# Patient Record
Sex: FEMALE | Race: WHITE | NOT HISPANIC OR LATINO | Employment: FULL TIME | URBAN - METROPOLITAN AREA
[De-identification: names, ages, dates, MRNs, and addresses within clinical notes are randomized per-mention and may not be internally consistent; named-entity substitution may affect disease eponyms.]

---

## 2023-03-09 ENCOUNTER — TELEPHONE (OUTPATIENT)
Dept: FAMILY MEDICINE CLINIC | Facility: CLINIC | Age: 28
End: 2023-03-09
Payer: COMMERCIAL

## 2023-03-09 NOTE — TELEPHONE ENCOUNTER
Caller: Fernanda Padilla    Relationship: Self    Best call back number: [phone]    What is the best time to reach you: ANYTIME    Who are you requesting to speak with (clinical staff, provider,  specific staff member): MA        What was the call regarding: PATIENT HAS A WOUND ON HER LEFT INDEX FINGER AND IT DOES NOT LOOK GOOD AND SHE WAS TRYING TO SEND PIC OF IT BUT HER MYCHART IS SAYING SHE DOES NOT HAVE A PCP LOADED SO SHE IS WANTING TO GET THAT UPDATED AND SPEAK TO SOMEONE ABOUT THE WOUND.    SHE IS ALSO TAKING AN ANTIBIOTIC THAT WAS PRESCRIBED TO HER BY DERMATOLOGIST FOR SOMETHING ELSE SHE IS JUST NOT SURE WHAT TO DO.      Do you require a callback: YES

## 2023-03-10 NOTE — TELEPHONE ENCOUNTER
I can't address the mychart access but she may need to go to urgent care.  Basic wound care of washing the finger, cleaning the wound and applying an antibiotic ointment is first line.

## 2024-01-31 ENCOUNTER — OFFICE VISIT (OUTPATIENT)
Dept: FAMILY MEDICINE CLINIC | Facility: CLINIC | Age: 29
End: 2024-01-31
Payer: COMMERCIAL

## 2024-01-31 VITALS
DIASTOLIC BLOOD PRESSURE: 73 MMHG | HEART RATE: 72 BPM | BODY MASS INDEX: 26.65 KG/M2 | WEIGHT: 190.4 LBS | RESPIRATION RATE: 16 BRPM | SYSTOLIC BLOOD PRESSURE: 112 MMHG | TEMPERATURE: 99 F | HEIGHT: 71 IN

## 2024-01-31 DIAGNOSIS — R63.5 WEIGHT GAIN: ICD-10-CM

## 2024-01-31 DIAGNOSIS — Z00.00 ANNUAL PHYSICAL EXAM: Primary | ICD-10-CM

## 2024-01-31 DIAGNOSIS — E66.3 OVERWEIGHT (BMI 25.0-29.9): ICD-10-CM

## 2024-01-31 DIAGNOSIS — Z23 ENCOUNTER FOR VACCINATION: ICD-10-CM

## 2024-01-31 DIAGNOSIS — Z11.59 ENCOUNTER FOR HEPATITIS C SCREENING TEST FOR LOW RISK PATIENT: ICD-10-CM

## 2024-01-31 DIAGNOSIS — Z13.220 NEED FOR LIPID SCREENING: ICD-10-CM

## 2024-01-31 RX ORDER — NORGESTIMATE AND ETHINYL ESTRADIOL 0.25-0.035
KIT ORAL
COMMUNITY
Start: 2023-10-18

## 2024-01-31 RX ORDER — SODIUM SULFACETAMIDE 100 MG/ML
LIQUID TOPICAL
COMMUNITY
Start: 2024-01-18

## 2024-01-31 RX ORDER — CLINDAMYCIN PHOSPHATE 10 UG/ML
LOTION TOPICAL
COMMUNITY

## 2024-01-31 RX ORDER — ESCITALOPRAM OXALATE 20 MG/1
TABLET, FILM COATED ORAL
COMMUNITY
Start: 2023-03-26

## 2024-01-31 RX ORDER — SPIRONOLACTONE 50 MG/1
TABLET, FILM COATED ORAL
COMMUNITY
Start: 2024-01-17

## 2024-01-31 NOTE — PATIENT INSTRUCTIONS
I have ordered lab tests today.  You should receive a phone call or a [employer] message with those results.  If you have not heard from us in 7-10 days, please call the office.      Try the runners stretch and rolling your foot on a frozen water bottle can help your foot.  Also arch supports. Also try 2 Aleve twice a day or Diclofenac gel.     We talked about weight loss medication but you're really not a candidate.  Wellbutrin may be an option to bring up with your mental health professional.  It may help with smoking.

## 2024-01-31 NOTE — PROGRESS NOTES
"Chief Complaint  Annual Exam    Subjective    {CC  Problem List  Visit  Diagnosis   Encounters  Notes  Medications  Labs  Result Review Imaging  Media :23}     Fernanda Padilla presents to [facility] for [facility] Exam.    History of Present Illness     Scalp bump.    Left foot pain on the bottom of her foot.  Worse after standing on her feet all day and first thing in the morning.      She's struggling with her weight.  It's been worse since a medication change.  She used Weight [employer] to lose 40pounds in the past and kept it off.  She's been trying for 5 months with WW and has actually gained.  Her med change were IUD to OCP, stopping Buspar and doubling spironolactone (just a couple weeks ago).  Her anxiety is not under control so she stopped Buspirone because she felt like it was making it worse.  She's still on Lexapro and working with a mental health professional.     Annual Exam    Last pap smear: today with Dr. Diaz  Last mammogram: too young  Contraception: OCP's and in a five year relationship  Regular menstrual cycles: yes  Last colonoscopy: too young  Ever screened for Hepatitis C: no  Vaccines: due flu  Exercise: walking to work and playing volleyball   Smoking status: former and some vaping  Alcohol use: socially and not even weekly  Sunscreen use: yes    Review of Systems     Objective       Vital Signs:   /73 (BP Location: Left arm, Patient Position: Sitting, Cuff Size: Adult)   Pulse 72   Temp 99 °F (37.2 °C) (Oral)   Resp 16   Ht 180.3 cm (71\")   Wt 86.4 kg (190 lb 6.4 oz)   BMI 26.56 kg/m²     Body mass index is 26.56 kg/m².       PHQ-9 Total Score: 0     Physical Exam     Result Review  Data Reviewed:{ Labs  Result Review  Imaging  Med Tab  Media :23}               Discussed healthy diet, exercise, adequate sleep, cancer screening, immunizations and preventative care. Annual eye exam and routine dental cleaning encouraged.        Assessment and Plan {CC " Problem List  Visit Diagnosis  ROS  Review (Popup)  [facility] Maintenance  Quality  BestPractice  Medications  SmartSets  [employer] Encounters  Media :23}   Diagnoses and all orders for this visit:    1. Annual physical exam (Primary)    2. Need for lipid screening  -     Comprehensive Metabolic Panel  -     Lipid Panel    3. Encounter for vaccination  -     Fluzone (or Fluarix & Flulaval for VFC) >6mos    4. Encounter for hepatitis C screening test for low risk patient  -     Hepatitis C Antibody    5. Weight gain  -     TSH    6. Overweight (BMI 25.0-29.9)  Assessment & Plan:  Patient's (Body mass index is 26.56 kg/m².) indicates that they are overweight with health conditions that include none . Weight is worsening. BMI is is above average; BMI management plan is completed. We discussed exercise and portion control as well as various medications that she really doesn't qualify for. .           Patient Instructions   I have ordered lab tests today.  You should receive a phone call or a [employer] message with those results.  If you have not heard from us in 7-10 days, please call the office.      Try the runners stretch and rolling your foot on a frozen water bottle can help your foot.  Also arch supports. Also try 2 Aleve twice a day or Diclofenac gel.     We talked about weight loss medication but you're really not a candidate.  Wellbutrin may be an option to bring up with your mental health professional.  It may help with smoking.        No follow-ups on file.    Kimberlee Bermudez MD

## 2024-01-31 NOTE — ASSESSMENT & PLAN NOTE
Patient's (Body mass index is 26.56 kg/m².) indicates that they are overweight with health conditions that include none . Weight is worsening. BMI is is above average; BMI management plan is completed. We discussed exercise and portion control as well as various medications that she really doesn't qualify for. .

## 2024-02-01 LAB
ALBUMIN SERPL-MCNC: 4.5 G/DL (ref 3.5–5.2)
ALBUMIN/GLOB SERPL: 1.6 G/DL
ALP SERPL-CCNC: 47 U/L (ref 39–117)
ALT SERPL-CCNC: 13 U/L (ref 1–33)
AST SERPL-CCNC: 15 U/L (ref 1–32)
BILIRUB SERPL-MCNC: 0.5 MG/DL (ref 0–1.2)
BUN SERPL-MCNC: 13 MG/DL (ref 6–20)
BUN/CREAT SERPL: 18.8 (ref 7–25)
CALCIUM SERPL-MCNC: 9.9 MG/DL (ref 8.6–10.5)
CHLORIDE SERPL-SCNC: 104 MMOL/L (ref 98–107)
CHOLEST SERPL-MCNC: 197 MG/DL (ref 0–200)
CO2 SERPL-SCNC: 22.5 MMOL/L (ref 22–29)
CREAT SERPL-MCNC: 0.69 MG/DL (ref 0.57–1)
EGFRCR SERPLBLD CKD-EPI 2021: 121.4 ML/MIN/1.73
GLOBULIN SER CALC-MCNC: 2.9 GM/DL
GLUCOSE SERPL-MCNC: 88 MG/DL (ref 65–99)
HDLC SERPL-MCNC: 76 MG/DL (ref 40–60)
LDLC SERPL CALC-MCNC: 104 MG/DL (ref 0–100)
POTASSIUM SERPL-SCNC: 4.3 MMOL/L (ref 3.5–5.2)
PROT SERPL-MCNC: 7.4 G/DL (ref 6–8.5)
SODIUM SERPL-SCNC: 138 MMOL/L (ref 136–145)
TRIGL SERPL-MCNC: 94 MG/DL (ref 0–150)
TSH SERPL DL<=0.005 MIU/L-ACNC: 1.58 UIU/ML (ref 0.27–4.2)
VLDLC SERPL CALC-MCNC: 17 MG/DL (ref 5–40)

## 2025-02-05 ENCOUNTER — OFFICE VISIT (OUTPATIENT)
Dept: FAMILY MEDICINE CLINIC | Facility: CLINIC | Age: 30
End: 2025-02-05
Payer: COMMERCIAL

## 2025-02-05 VITALS
WEIGHT: 189.9 LBS | DIASTOLIC BLOOD PRESSURE: 70 MMHG | SYSTOLIC BLOOD PRESSURE: 108 MMHG | TEMPERATURE: 98.5 F | BODY MASS INDEX: 26.59 KG/M2 | OXYGEN SATURATION: 97 % | HEIGHT: 71 IN | HEART RATE: 79 BPM

## 2025-02-05 DIAGNOSIS — Z13.220 NEED FOR LIPID SCREENING: ICD-10-CM

## 2025-02-05 DIAGNOSIS — L70.0 ACNE VULGARIS: ICD-10-CM

## 2025-02-05 DIAGNOSIS — Z00.00 ANNUAL PHYSICAL EXAM: Primary | ICD-10-CM

## 2025-02-05 DIAGNOSIS — F41.1 GENERALIZED ANXIETY DISORDER: ICD-10-CM

## 2025-02-05 DIAGNOSIS — R19.5 LOOSE STOOLS: ICD-10-CM

## 2025-02-05 DIAGNOSIS — J34.89 NASAL CRUSTING: ICD-10-CM

## 2025-02-05 DIAGNOSIS — L50.9 HIVES: ICD-10-CM

## 2025-02-05 PROCEDURE — 99213 OFFICE O/P EST LOW 20 MIN: CPT | Performed by: FAMILY MEDICINE

## 2025-02-05 PROCEDURE — 99395 PREV VISIT EST AGE 18-39: CPT | Performed by: FAMILY MEDICINE

## 2025-02-05 RX ORDER — CETIRIZINE HYDROCHLORIDE 10 MG/1
10 TABLET ORAL DAILY
COMMUNITY

## 2025-02-05 RX ORDER — DICYCLOMINE HYDROCHLORIDE 10 MG/1
10 CAPSULE ORAL
Qty: 60 CAPSULE | Refills: 2 | Status: SHIPPED | OUTPATIENT
Start: 2025-02-05

## 2025-02-05 RX ORDER — BUPROPION HYDROCHLORIDE 150 MG/1
1 TABLET ORAL DAILY
COMMUNITY
Start: 2024-11-12

## 2025-02-05 NOTE — PROGRESS NOTES
"Chief Complaint  Annual Exam    Subjective    {CC  Problem List  Visit  Diagnosis   Encounters  Notes  Medications  Labs  Result Review Imaging  Media :23}     Fernanda Padilla presents to [facility] [place] for Annual Exam.    History of Present Illness     Annual Exam    Last pap smear: 2/5/2025 at [facility] and goes today.   Last mammogram: too young  Contraception: OCP  Regular menstrual cycles: yes  Last colonoscopy: none  Ever screened for Hepatitis C: yes  Vaccines: due flu and covid  Exercise: volleyball and walking in nice weather.   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  She may be changing jobs/industry.     Chronic issues with bloody nose on the left.  It scabs and then she picks the scab off and it the scab comes back     She's upset with her weight and hasn't done it in a healthy way in the past. She gets obsessed with the numbers and wants to lose.    She's having loose stools that contain undigested foods.  They are not solid.    Sees Derm for acne and is on spironolactone.     She sees Ms Brock for mental health.  She's on Lexapro 20mg and Wellbutrin XL 150mg.  Not in counseling and feels okay without it right now.    She had an episode of hives this summer and she's taking Zyrtec routinely so it won't happen again.      Review of Systems     Objective       Vital Signs:   /70 (BP Location: Left arm, Patient Position: Sitting, Cuff Size: Adult)   Pulse 79   Temp 98.5 °F (36.9 °C) (Oral)   Ht 180.3 cm (70.98\")   Wt 86.1 kg (189 lb 14.4 oz)   SpO2 97%   BMI 26.50 kg/m²     Body mass index is 26.5 kg/m².      PHQ-9 Total Score:      Physical Exam  Constitutional:       General: She is not in acute distress.     Appearance: Normal appearance.   HENT:      Head: Normocephalic and atraumatic.      Nose: Nose normal.      Comments: Crusting left " nostril  Eyes:      Conjunctiva/sclera: Conjunctivae normal.      Pupils: Pupils are equal, round, and reactive to light.   Cardiovascular:      Rate and Rhythm: Normal rate and regular rhythm.      Heart sounds: Normal heart sounds.   Pulmonary:      Effort: Pulmonary effort is normal.      Breath sounds: Normal breath sounds.   Abdominal:      General: Bowel sounds are normal.      Palpations: Abdomen is soft.   Musculoskeletal:      Cervical back: Neck supple.   Skin:     General: Skin is warm.   Neurological:      General: No focal deficit present.      Mental Status: She is alert.      Gait: Gait normal.   Psychiatric:         Behavior: Behavior normal.          Result Review  Data Reviewed:{ Labs  Result Review  Imaging  Med Tab  Media :23}               Discussed healthy diet, exercise, adequate sleep, cancer screening, immunizations and preventative care. Annual eye exam and routine dental cleaning encouraged.        Assessment and Plan {CC Problem List  Visit Diagnosis  ROS  Review (Popup)  Health Maintenance  Quality  BestPractice  Medications  SmartSets  Snap[employer]ot Encounters  Media :23}   Diagnoses and all orders for this visit:    1. Annual physical exam (Primary)  -     ABO/Rh    2. Need for lipid screening  -     Comprehensive Metabolic Panel  -     Lipid Panel    3. Loose stools  -     CBC & Differential  -     TSH  -     dicyclomine (BENTYL) 10 MG capsule; Take 1 capsule by mouth 4 (Four) Times a Day Before Meals & at Bedtime. As needed for cramping and loose stools.  Dispense: 60 capsule; Refill: 2    4. Acne vulgaris    5. Generalized anxiety disorder  -     Vitamin B12    6. Nasal crusting    7. Hives      BMI is >= 25 and <30. (Overweight) The following options were offered after discussion;: weight loss educational material (shared in after visit summary)       Patient Instructions   Get your flu and Moderna vaccine at the pharmacy since we don't have either.    I have ordered lab  tests today.  You should receive a phone call or a [employer] message with those results.  If you have not heard from us in 7-10 days, please call the office.      Use Vaseline on a Q - tip multiple times a day to keep that spot soft and it should heal over the next several weeks.     Try Bentyl with any cramping or loose stools.     Aim for 150 minutes of moderate exercise in a week.     You could try coming off Zyrtec because most people don't get recurrent hives.  If they come back, start back.          No follow-ups on file.    Kimberlee Bermudez MD

## 2025-02-05 NOTE — PATIENT INSTRUCTIONS
Get your flu and Moderna vaccine at the pharmacy since we don't have either.    I have ordered lab tests today.  You should receive a phone call or a [employer] message with those results.  If you have not heard from us in 7-10 days, please call the office.      Use Vaseline on a Q - tip multiple times a day to keep that spot soft and it should heal over the next several weeks.     Try Bentyl with any cramping or loose stools.     Aim for 150 minutes of moderate exercise in a week.     You could try coming off Zyrtec because most people don't get recurrent hives.  If they come back, start back.

## 2025-02-06 ENCOUNTER — PATIENT MESSAGE (OUTPATIENT)
Dept: FAMILY MEDICINE CLINIC | Facility: CLINIC | Age: 30
End: 2025-02-06
Payer: COMMERCIAL

## 2025-02-06 LAB
ABO GROUP BLD: NORMAL
ALBUMIN SERPL-MCNC: 4.2 G/DL (ref 3.5–5.2)
ALBUMIN/GLOB SERPL: 1.4 G/DL
ALP SERPL-CCNC: 56 U/L (ref 39–117)
ALT SERPL-CCNC: 13 U/L (ref 1–33)
AST SERPL-CCNC: 13 U/L (ref 1–32)
BASOPHILS # BLD AUTO: 0.01 10*3/MM3 (ref 0–0.2)
BASOPHILS NFR BLD AUTO: 0.3 % (ref 0–1.5)
BILIRUB SERPL-MCNC: 0.9 MG/DL (ref 0–1.2)
BUN SERPL-MCNC: 12 MG/DL (ref 6–20)
BUN/CREAT SERPL: 15.8 (ref 7–25)
CALCIUM SERPL-MCNC: 10.1 MG/DL (ref 8.6–10.5)
CHLORIDE SERPL-SCNC: 103 MMOL/L (ref 98–107)
CHOLEST SERPL-MCNC: 205 MG/DL (ref 0–200)
CO2 SERPL-SCNC: 22.5 MMOL/L (ref 22–29)
CREAT SERPL-MCNC: 0.76 MG/DL (ref 0.57–1)
EGFRCR SERPLBLD CKD-EPI 2021: 108.9 ML/MIN/1.73
EOSINOPHIL # BLD AUTO: 0.02 10*3/MM3 (ref 0–0.4)
EOSINOPHIL NFR BLD AUTO: 0.5 % (ref 0.3–6.2)
ERYTHROCYTE [DISTWIDTH] IN BLOOD BY AUTOMATED COUNT: 11.9 % (ref 12.3–15.4)
GLOBULIN SER CALC-MCNC: 2.9 GM/DL
GLUCOSE SERPL-MCNC: 79 MG/DL (ref 65–99)
HCT VFR BLD AUTO: 38.1 % (ref 34–46.6)
HDLC SERPL-MCNC: 65 MG/DL (ref 40–60)
HGB BLD-MCNC: 13.1 G/DL (ref 12–15.9)
IMM GRANULOCYTES # BLD AUTO: 0.01 10*3/MM3 (ref 0–0.05)
IMM GRANULOCYTES NFR BLD AUTO: 0.3 % (ref 0–0.5)
LDLC SERPL CALC-MCNC: 124 MG/DL (ref 0–100)
LYMPHOCYTES # BLD AUTO: 1.2 10*3/MM3 (ref 0.7–3.1)
LYMPHOCYTES NFR BLD AUTO: 31.6 % (ref 19.6–45.3)
MCH RBC QN AUTO: 31.4 PG (ref 26.6–33)
MCHC RBC AUTO-ENTMCNC: 34.4 G/DL (ref 31.5–35.7)
MCV RBC AUTO: 91.4 FL (ref 79–97)
MONOCYTES # BLD AUTO: 0.37 10*3/MM3 (ref 0.1–0.9)
MONOCYTES NFR BLD AUTO: 9.7 % (ref 5–12)
NEUTROPHILS # BLD AUTO: 2.19 10*3/MM3 (ref 1.7–7)
NEUTROPHILS NFR BLD AUTO: 57.6 % (ref 42.7–76)
NRBC BLD AUTO-RTO: 0 /100 WBC (ref 0–0.2)
PLATELET # BLD AUTO: 223 10*3/MM3 (ref 140–450)
POTASSIUM SERPL-SCNC: 4.2 MMOL/L (ref 3.5–5.2)
PROT SERPL-MCNC: 7.1 G/DL (ref 6–8.5)
RBC # BLD AUTO: 4.17 10*6/MM3 (ref 3.77–5.28)
RH BLD: POSITIVE
SODIUM SERPL-SCNC: 139 MMOL/L (ref 136–145)
TRIGL SERPL-MCNC: 92 MG/DL (ref 0–150)
TSH SERPL DL<=0.005 MIU/L-ACNC: 1.48 UIU/ML (ref 0.27–4.2)
VIT B12 SERPL-MCNC: 492 PG/ML (ref 211–946)
VLDLC SERPL CALC-MCNC: 16 MG/DL (ref 5–40)
WBC # BLD AUTO: 3.8 10*3/MM3 (ref 3.4–10.8)

## 2025-02-24 DIAGNOSIS — R19.5 LOOSE STOOLS: ICD-10-CM

## 2025-02-24 RX ORDER — DICYCLOMINE HYDROCHLORIDE 10 MG/1
10 CAPSULE ORAL
Qty: 60 CAPSULE | Refills: 2 | Status: SHIPPED | OUTPATIENT
Start: 2025-02-24 | End: 2025-02-26 | Stop reason: SDUPTHER

## 2025-02-24 NOTE — TELEPHONE ENCOUNTER
Rx Refill Note  Requested Prescriptions     Pending Prescriptions Disp Refills    dicyclomine (BENTYL) 10 MG capsule 60 capsule 2     Sig: Take 1 capsule by mouth 4 (Four) Times a Day Before Meals & at Bedtime. As needed for cramping and loose stools.      Last office visit with prescribing clinician: 2/5/2025   Next office visit with prescribing clinician: 2/11/2026     Luciano Webster MA  02/24/25, 09:24 EST

## 2025-02-26 DIAGNOSIS — R19.5 LOOSE STOOLS: ICD-10-CM

## 2025-02-26 RX ORDER — DICYCLOMINE HYDROCHLORIDE 10 MG/1
10 CAPSULE ORAL
Qty: 60 CAPSULE | Refills: 2 | Status: SHIPPED | OUTPATIENT
Start: 2025-02-26

## 2025-02-26 NOTE — TELEPHONE ENCOUNTER
Rx Refill Note  Requested Prescriptions     Pending Prescriptions Disp Refills    dicyclomine (BENTYL) 10 MG capsule 60 capsule 2     Sig: Take 1 capsule by mouth 4 (Four) Times a Day Before Meals & at Bedtime. As needed for cramping and loose stools.      Last office visit with prescribing clinician: 2/5/2025   Next office visit with prescribing clinician: 2/11/2026     Luciano Webster MA  02/26/25, 15:16 EST